# Patient Record
Sex: MALE | Race: AMERICAN INDIAN OR ALASKA NATIVE
[De-identification: names, ages, dates, MRNs, and addresses within clinical notes are randomized per-mention and may not be internally consistent; named-entity substitution may affect disease eponyms.]

---

## 2018-09-26 NOTE — ANESTHESIA CONSULTATION
Anesthesia Consult and Med Hx


Date of service: 09/26/18





- Airway


Anesthetic Teeth Evaluation: Good


ROM Head & Neck: Adequate


Mental/Hyoid Distance: Adequate


Mallampati Class: Class I


Intubation Access Assessment: Good





- Pulmonary Exam


CTA: Yes





- Cardiac Exam


Cardiac Exam: RRR





- Pre-Operative Health Status


ASA Pre-Surgery Classification: ASA2


Proposed Anesthetic Plan: MAC (NPO, Hemodynamically stable for procedure)

## 2018-09-26 NOTE — XRAY REPORT
CHEST ONE VIEW



INDICATION: Chest pain, nausea, vomiting.



COMPARISON: None similar at this institution.



FINDINGS: Portable, single, frontal chest radiograph demonstrates 

normal cardiomediastinal silhouette. Clear lungs. Unremarkable bones. 

Extrinsic EKG leads.



CONCLUSION: No acute disease in the chest.



Thank you for the opportunity to participate in this patient's care.

## 2018-09-26 NOTE — OPERATIVE REPORT
Operative Report


Operative Report: 


Esophagogastroduodenoscopy Procedure Note 





Date of procedure: 9/26/2018





Endoscopist: Davon Calderon





Pre-op diagnosis: UGI bleed





Post-op diagnosis: Severe (LA Grade IV esophagitis), small brenda joaquin tear, 

gastritis





Anesthesia: MAC





Complications: No immediate complications





Estimated blood loss: minimal





Procedure:  After consent was obtained, the patient was placed in the left 

lateral decubitus position.  The fujinon endoscope was inserted into the patient

's mouth under direct vision, and advanced into the 2nd portion of the duodenum 

without difficulty.  The patient tolerated the procedure well.  The views of 

the mucosa were good.  Patient's vital signs were monitored continuously 

throughout the procedure.





Findings:





There was severe (LA Grade IV) esophagitis in the mid-lower third of the 

esophagus.  There was a small possible brenda joaquin tear at the GE junction 

without high risk bleeding stigmata.





There was a moderate sized hiatal hernia.





There was severe localized erythematous gastric mucosa in the proximal body of 

the stomach (likely from multiple retching episodes based on location of 

erythema).  Otherwise, the stomach appeared normal.





The duodenum appeared normal.





Impression:


1. Severe esophagitis


2. Small brenda joaquin tear without high risk bleeding stigmata


3. Localized gastritis , likely from retching episodes.





Recommendations:


-cont PPI BID (po) dosing


-alcohol cessation


-avoid NSAID's


-repeat EGD in 2-3 months as outpatient to assess for esophagitis healing





Will sign off, please call as needed or with questions.  Follow-up in GI clinic 

in 1 month.

## 2018-09-26 NOTE — PROGRESS NOTE
Assessment and Plan





dictated 


no acute gu issues 


rec f/u as out pt 





Subjective


Date of service: 09/26/18


Principal diagnosis: renal stones 





Objective





- Constitutional


Vitals: 


 Vital Signs - 12hr











  09/26/18 09/26/18 09/26/18





  04:17 05:41 06:00


 


Temperature 98.2 F  98.5 F


 


Pulse Rate 75  75


 


Respiratory 18  16





Rate   


 


Blood Pressure 158/100  149/88


 


Blood Pressure   149/88





[Right]   


 


O2 Sat by Pulse 98 100 100





Oximetry   














  09/26/18 09/26/18 09/26/18





  06:11 06:30 07:00


 


Temperature 99.1 F  


 


Pulse Rate 69 73 83


 


Respiratory 17 17 18





Rate   


 


Blood Pressure  154/102 139/80


 


Blood Pressure 154/102  





[Right]   


 


O2 Sat by Pulse 98 98 99





Oximetry   














  09/26/18 09/26/18 09/26/18





  07:30 08:00 09:30


 


Temperature   


 


Pulse Rate 78 78 62


 


Respiratory 19 11 L 8 L





Rate   


 


Blood Pressure 111/79 148/95 148/95


 


Blood Pressure   





[Right]   


 


O2 Sat by Pulse 98 99 100





Oximetry   














  09/26/18 09/26/18 09/26/18





  09:40 09:50 10:18


 


Temperature   


 


Pulse Rate 75 85 82


 


Respiratory 14 13 





Rate   


 


Blood Pressure 148/95 148/95 147/92


 


Blood Pressure   





[Right]   


 


O2 Sat by Pulse 99 92 100





Oximetry   














  09/26/18 09/26/18 09/26/18





  11:54 12:00 12:58


 


Temperature 97.8 F 98.4 F 98.6 F


 


Pulse Rate 63 72 72


 


Respiratory 20 16 11 L





Rate   


 


Blood Pressure 118/78  128/80


 


Blood Pressure  142/78 





[Right]   


 


O2 Sat by Pulse 96 100 99





Oximetry   














  09/26/18 09/26/18





  13:04 14:26


 


Temperature 98.6 F 98.7 F


 


Pulse Rate 72 83


 


Respiratory 11 L 14





Rate  


 


Blood Pressure 128/80 139/99


 


Blood Pressure  





[Right]  


 


O2 Sat by Pulse 99 99





Oximetry  











General appearance: Present: no acute distress





- Neck


Neck: supple





- Respiratory


Respiratory effort: normal





- Gastrointestinal


General gastrointestinal: Present: non-tender


Rectal Exam: normal rectal tone





- Genitourinary


Male genitourinary: normal





- Labs


CBC & Chem 7: 


 09/26/18 01:29





 09/26/18 01:29


Labs: 


 Abnormal lab results











  09/26/18 09/26/18 09/26/18 Range/Units





  01:29 01:29 10:30 


 


WBC  13.4 H    (4.5-11.0)  K/mm3


 


RDW  12.9 L    (13.2-15.2)  %


 


Seg Neutrophils %  77.8 H    (40.0-70.0)  %


 


Seg Neutrophils #  10.4 H    (1.8-7.7)  K/mm3


 


Potassium   3.4 L   (3.6-5.0)  mmol/L


 


Creatinine   0.7 L   (0.8-1.5)  mg/dL


 


Glucose   147 H   ()  mg/dL


 


Ur Specific Gravity    > 1.050 H  (1.003-1.030)

## 2018-09-26 NOTE — CAT SCAN REPORT
CT ABDOMEN AND PELVIS WITH CONTRAST



INDICATION: Abdominal pain, nausea, vomiting.  Patient states history 

of pancreatitis.



COMPARISON: None similar.



FINDINGS: Abdomen and pelvis CT performed following intravenous 

administration of 100 cc of Omnipaque 300.



LUNG BASES: Moderate to severe nonspecific distal esophageal wall 

prominence/thickening, not excluded for gastroesophageal reflux and/or 

hiatal hernia, amongst others.



ABDOMEN: Liver, spleen, gallbladder, pancreas, adrenals, nonaneurysmal 

abdominal aorta with slight atherosclerotic changes and IVC within 

normal limits.  No ascites or size significant adenopathy.  

Nonopacified GI tract valuation limited, though grossly nonobstructive. 

 Stomach decompressed with nonspecific exaggerated wall thickness.  

Multiple ascending colon diverticula incidentally noted.



Mild left intrarenal collecting system, renal pelvis and proximal 

ureteral dilatation noted, though proximal ureter promptly tapers to a 

normal caliber anterior to the psoas muscle as on axial series 2, 

images 81-90.  A 5 mm left interpolar calculus also noted, axial image 

62.  Tiny, subcentimeter bilateral renal cortical hypodensities.  

Otherwise unremarkable kidneys.  No hydronephrosis on the right.



PELVIS: Opacified right ureter is unremarkable.  Left ureter though not 

opacified and difficult to accurately follow to the bladder base with a 

tiny 2-3 mm left hemipelvic calcification as on axial series 4, image 

53 presumed to represent a phlebolith rather than a distal ureteral 

stone.  Few other pelvic vascular calcifications also noted.  Otherwise 

unremarkable urinary bladder, seminal vesicles, prostate and 

rectosigmoid.  No free fluid or significant adenopathy.  Approximately 

1.5 cm fat containing right inguinal hernia proximally, axial image 158.



Bilateral SI joint degenerative bridging osteophytes.  Mild lower 

thoracic and upper lumbar spine degenerative spurring also seen.



CONCLUSION:



1. Extensive distal esophageal thickening as also nonspecific 

exaggerated gastric wall thickness, as detailed above.  Etiologies as 

esophagitis/gastroesophageal reflux/hiatal hernia and peptic ulcer 

disease, amongst others, may be correlated for clinically in an 

appropriate setting.



2.  Approximately 5 mm left renal calculus with mild dilatation of the 

left renal collecting system, as detailed above.  No obstructing 

ureteral calculus though convincingly identified, as described.



3.  Few other findings as diverticulosis, as above.



I phoned the above results to Dr. Lopez in the ER, 8:40 AM, 

9/26/2018.



Thank you for the opportunity to participate in this patient's care.

## 2018-09-26 NOTE — EMERGENCY DEPARTMENT REPORT
ED GI Bleed HPI





- General


Chief complaint: Abdominal Pain


Stated complaint: CP


Time Seen by Provider: 09/26/18 07:37


Source: patient, RN notes reviewed


Mode of arrival: Ambulatory


Limitations: No Limitations





- History of Present Illness


Initial comments: 





This is a 38-year-old gentleman who is not known to this provider previously, 

who does not currently have a primary care doctor, and reports surgical history 

for appendectomy.  He reports a past medical history for pancreatitis.





Patient reports that he was in his usual state of health a few days ago, and 

consumed some fried and fatty foods, and shortly thereafter developed 

epigastric pain.  The pain did not radiate to the back, arms or neck.  It was 

associated by nausea and vomiting.  He reports coffee-ground emesis.  He thinks 

that his first episode of emesis was the food that he ate, and then was 

subsequently coffee-ground emesis.  Patient was given medication prior to my 

arrival, including Zofran, which he indicates improved his symptoms.  He denies 

DVT, pulmonary embolus risk factors.  He denies headache, neck pain, shortness 

of breath, lower abdominal pain, irritative/obstructive urinary symptoms.





MD complaint: coffee ground emesis


-: Gradual


Location: epigastric


Radiation: none


Severity scale (0 -10): 8


Quality: cramping


Consistency: intermittent


Improves with: none


Worsens with: none


Associated Symptoms: abdominal pain, nausea, vomiting.  denies: epistaxis, fever

/chills, headaches, loss of appetite, malaise, easy bruising, rash, other 

bleeding, shortness of breath, syncope, weakness





- Related Data


 Allergies











Allergy/AdvReac Type Severity Reaction Status Date / Time


 


No Known Allergies Allergy   Verified 09/26/18 01:19














ED Review of Systems


ROS: 


Stated complaint: CP


Other details as noted in HPI





Constitutional: denies: fever


Eyes: denies: eye discharge


ENT: denies: epistaxis


Respiratory: denies: cough


Cardiovascular: denies: syncope


Gastrointestinal: abdominal pain, nausea, vomiting.  denies: melena, 

hematochezia


Genitourinary: denies: dysuria


Musculoskeletal: denies: back pain


Skin: denies: lesions


Neurological: denies: weakness


Psychiatric: anxiety





ED Past Medical Hx





- Past Medical History


Previous Medical History?: Yes


Additional medical history: pancreatis





- Surgical History


Hx Appendectomy: Yes


Additional Surgical History: tonsil





- Social History


Smoking Status: Current Every Day Smoker


Substance Use Type: Alcohol





ED Physical Exam





- General


Limitations: No Limitations


General appearance: alert, in no apparent distress





- Head


Head exam: Present: atraumatic, normocephalic





- Eye


Eye exam: Present: normal appearance, EOMI.  Absent: nystagmus





- ENT


ENT exam: Present: normal exam, normal orophraynx, mucous membranes moist, 

normal external ear exam





- Neck


Neck exam: Present: normal inspection, full ROM.  Absent: tenderness, 

meningismus





- Respiratory


Respiratory exam: Present: normal lung sounds bilaterally.  Absent: respiratory 

distress





- Cardiovascular


Cardiovascular Exam: Present: regular rate, normal rhythm, normal heart sounds.

  Absent: bradycardia, tachycardia, irregular rhythm, systolic murmur, 

diastolic murmur, rubs, gallop





- GI/Abdominal


GI/Abdominal exam: Present: soft.  Absent: distended, tenderness, guarding, 

rebound, rigid, pulsatile mass





- Rectal


Rectal exam: Present: deferred





- Extremities Exam


Extremities exam: Present: normal inspection, full ROM, normal capillary refill

, other (2+ pulses noted in the bilateral upper, lower extremities.  

Compartments soft.  No long bony tenderness.  The pelvis is stable.).  Absent: 

tenderness, pedal edema, joint swelling, calf tenderness





- Back Exam


Back exam: Present: normal inspection, full ROM.  Absent: tenderness, CVA 

tenderness (R), paraspinal tenderness, vertebral tenderness





- Neurological Exam


Neurological exam: Present: alert, oriented X3, CN II-XII intact, normal gait, 

other (Extraocular movements intact.  Tongue midline.  No facial droop.  Facial 

sensation intact to light touch in the V1, V2, V3 distribution bilaterally.  5 

and 5 strength in 4 extremities..  Sensation is intact to light touch in 4 

extremities.).  Absent: motor sensory deficit





- Psychiatric


Psychiatric exam: Present: normal affect, normal mood





- Skin


Skin exam: Present: warm, dry, intact, normal color.  Absent: rash





ED Course


 Vital Signs











  09/26/18 09/26/18 09/26/18





  01:15 04:17 05:41


 


Temperature 99.5 F 98.2 F 


 


Pulse Rate 70 75 


 


Respiratory 22 18 





Rate   


 


Blood Pressure 163/101 158/100 


 


Blood Pressure   





[Right]   


 


O2 Sat by Pulse 99 98 100





Oximetry   














  09/26/18 09/26/18 09/26/18





  06:00 06:11 06:30


 


Temperature 98.5 F 99.1 F 


 


Pulse Rate 75 69 73


 


Respiratory 16 17 17





Rate   


 


Blood Pressure 149/88  154/102


 


Blood Pressure 149/88 154/102 





[Right]   


 


O2 Sat by Pulse 100 98 98





Oximetry   














ED Medical Decision Making





- Lab Data


Result diagrams: 


 09/26/18 01:29





 09/26/18 01:29








 Vital Signs











  09/26/18 09/26/18 09/26/18





  01:15 04:17 05:41


 


Temperature 99.5 F 98.2 F 


 


Pulse Rate 70 75 


 


Respiratory 22 18 





Rate   


 


Blood Pressure 163/101 158/100 


 


Blood Pressure   





[Right]   


 


O2 Sat by Pulse 99 98 100





Oximetry   














  09/26/18 09/26/18 09/26/18





  06:00 06:11 06:30


 


Temperature 98.5 F 99.1 F 


 


Pulse Rate 75 69 73


 


Respiratory 16 17 17





Rate   


 


Blood Pressure 149/88  154/102


 


Blood Pressure 149/88 154/102 





[Right]   


 


O2 Sat by Pulse 100 98 98





Oximetry   











 Lab Results











  09/26/18 09/26/18 Range/Units





  01:29 01:29 


 


WBC  13.4 H   (4.5-11.0)  K/mm3


 


RBC  4.69   (3.65-5.03)  M/mm3


 


Hgb  14.6   (11.8-15.2)  gm/dl


 


Hct  43.0   (35.5-45.6)  %


 


MCV  92   (84-94)  fl


 


MCH  31   (28-32)  pg


 


MCHC  34   (32-34)  %


 


RDW  12.9 L   (13.2-15.2)  %


 


Plt Count  240   (140-440)  K/mm3


 


Lymph % (Auto)  16.0   (13.4-35.0)  %


 


Mono % (Auto)  5.9   (0.0-7.3)  %


 


Eos % (Auto)  0.0   (0.0-4.3)  %


 


Baso % (Auto)  0.3   (0.0-1.8)  %


 


Lymph #  2.1   (1.2-5.4)  K/mm3


 


Mono #  0.8   (0.0-0.8)  K/mm3


 


Eos #  0.0   (0.0-0.4)  K/mm3


 


Baso #  0.0   (0.0-0.1)  K/mm3


 


Seg Neutrophils %  77.8 H   (40.0-70.0)  %


 


Seg Neutrophils #  10.4 H   (1.8-7.7)  K/mm3


 


Sodium   141  (137-145)  mmol/L


 


Potassium   3.4 L  (3.6-5.0)  mmol/L


 


Chloride   98.9  ()  mmol/L


 


Carbon Dioxide   27  (22-30)  mmol/L


 


Anion Gap   19  mmol/L


 


BUN   11  (9-20)  mg/dL


 


Creatinine   0.7 L  (0.8-1.5)  mg/dL


 


Estimated GFR   > 60  ml/min


 


BUN/Creatinine Ratio   16  %


 


Glucose   147 H  ()  mg/dL


 


Calcium   9.6  (8.4-10.2)  mg/dL


 


Total Bilirubin   0.50  (0.1-1.2)  mg/dL


 


AST   15  (5-40)  units/L


 


ALT   15  (7-56)  units/L


 


Alkaline Phosphatase   72  ()  units/L


 


Total Protein   7.7  (6.3-8.2)  g/dL


 


Albumin   4.6  (3.9-5)  g/dL


 


Albumin/Globulin Ratio   1.5  %


 


Lipase   18  (13-60)  units/L














- EKG Data


-: EKG Interpreted by Me


EKG shows normal: sinus rhythm


Rate: normal





- EKG Data


When compared to previous EKG there are: previous EKG unavailable





09/26/18 08:07


Sinus, 75 bpm, normal axis, normal intervals, high left ventricular voltage, 

motion artifact.  Not a STEMI





- Radiology Data


Radiology results: image reviewed


interpreted by me: 





X-ray of the chest is negative for acute disease





- Medical Decision Making





Differential diagnosis, including but not limited to: Pancreatitis, Adelia-

Bray tear, upper GI bleed, hiatal hernia, GERD, gastritis





Assessment and plan: 38-year-old male with history of pancreatitis who recently 

consumed alcohol and fatty foods, with epigastric pain and coffee-ground 

emesis.  Patient has a photograph of the emesis and it looks quite copious.  He 

is hemodynamically stable, and denies bright red blood or black tarry stool.  X-

ray of the chest is unremarkable.  Hemoglobin, hematocrit appropriate within 

normal limits at this time.  I suspect a Adelia-Bray tear.  However, given 

volume of coffee ground emesis noted on photograph, patient will be admitted to 

the medical service for serial abdominal exams, IV fluids, IV proton pump 

inhibitor, and gastroenterology consultation.





The gastroenterologist on call, Dr. Calderon has graciously agreed to consult on 

the patient.





Hospital physician, Dr. Bray to admit.


Critical care attestation.: 


If time is entered above; I have spent that time in minutes in the direct care 

of this critically ill patient, excluding procedure time.








ED Disposition


Clinical Impression: 


 Coffee ground emesis





Disposition: DC-09 OP ADMIT IP TO THIS HOSP


Is pt being admited?: Yes


Condition: Good


Referrals: 


PRIMARY CARE,MD [Primary Care Provider] - 3-5 Days

## 2018-09-26 NOTE — GASTROENTEROLOGY CONSULTATION
<MIGUELANGELKARLATEENA BECERRA - Last Filed: 09/26/18 10:48>





History of Present Illness





- Reason for Consult


Consult date: 09/26/18


coffee-ground emesis


Requesting physician: DANIEL HOLGUIN





- History of Present Illness


Patient is a 39 y/o male with PMH of alcoholic pancreatitis on whom GI has been 

consulted to evaluate and treat coffee-ground emesis. This morning, patient was 

resting on stretcher w/o acute distress. He reports drinking a large amount of 

alcohol and eating fatty foods Monday night after work then woke up yesterday 

morning with epigastric pain and associated N/V. He states his emesis was food 

colored with the first few episodes of vomiting and then it turned into a black 

color mixed with a small amount of bright red blood for subsequent episodes. 

Last episode of vomiting was early this am between 0100 and 0300. States he 

also had diffuse CP described as a "burning" while vomiting that has since 

resolved. No melena or hematochezia. Last BM 2 days ago with brown stool per 

pt. Denies fever, SOB, dizziness, dysphagia, odynophagia, diarrhea, or 

constipation. No NSAID use. No hx of liver disease, GI bleeding or PUD. No 

previous EGD. 











Past History


Past Medical History: GERD, hypertension (controlled with diet), other (h/o 

alcoholic pancreatitis)


Past Surgical History: appendectomy, tonsillectomy, Other (kidney surgery)


Social history: other (+alcohol and marijuana use).  denies: smoking


Family history: diabetes, hypertension





Medications and Allergies


 Allergies











Allergy/AdvReac Type Severity Reaction Status Date / Time


 


No Known Allergies Allergy   Verified 09/26/18 01:19











Active Meds: 


Active Medications





Haloperidol Lactate (Haldol)  5 mg IV Q1H PRN


   PRN Reason: Unrespon. to mult. doses BZD's


Hydralazine HCl (Apresoline)  10 mg IV Q4HR PRN


   PRN Reason: Blood Pressure


Hydromorphone HCl (Dilaudid)  1 mg IV Q4H PRN


   PRN Reason: Pain , Severe (7-10)


Potassium Chloride (Kcl 10meq/100ml)  10 meq in 100 mls @ 100 mls/hr IV Q1H JAYLYN


   Stop: 09/26/18 10:59


Ceftriaxone Sodium (Rocephin/Ns 1 Gm/50 Ml)  1 gm in 50 mls @ 100 mls/hr IV 

Q24HR JAYLYN; Protocol


Sodium Chloride (Nacl 0.45% 1000 Ml)  1,000 mls @ 100 mls/hr IV AS DIRECT JAYLYN


Thiamine HCl 100 mg/ Sodium (Chloride)  51 mls @ 100 mls/hr IV QDAY JAYLYN


Lorazepam (Ativan)  2 mg IV Q1H PRN


   PRN Reason: CIWA-Ar 8-15


Lorazepam (Ativan)  4 mg IV Q1H PRN


   PRN Reason: CIWA-Ar 16-25


Lorazepam (Ativan)  4 mg IV Q15MIN PRN


   PRN Reason: CIWA-Ar >25


Metoclopramide HCl (Reglan)  10 mg IV Q8H PRN


   PRN Reason: Nausea And Vomiting


Ondansetron HCl (Zofran)  4 mg IV Q4H PRN


   PRN Reason: N/V unrelieved by Reglan


Pantoprazole Sodium (Protonix)  40 mg IV BID JAYLYN


   Last Admin: 09/26/18 09:47 Dose:  Not Given











Review of Systems





- Review of Systems


All systems: negative


Gastrointestinal: abdominal pain (epigastric), nausea, vomiting, coffee ground 

emesis





Exam





- Constitutional


Vital Signs: 


 











Temp Pulse Resp BP Pulse Ox


 


 99.1 F   78   11 L  148/95   99 


 


 09/26/18 06:11  09/26/18 08:00  09/26/18 08:00  09/26/18 08:00  09/26/18 08:00











General appearance: no acute distress





- EENT


Eyes: PERRL, EOM intact


ENT: hearing intact





- Respiratory


Respiratory: bilateral: CTA





- Cardiovascular


Rhythm: regular


Heart Sounds: Present: S1 & S2





- Gastrointestinal


General gastrointestinal: Present: soft, tender (slight generalized TTP ), non-

distended, normal bowel sounds





- Neurologic


Neurological: alert and oriented x3





- Labs


CBC & Chem 7: 


 09/26/18 01:29





 09/26/18 01:29


Lab Results: 


 Laboratory Results - last 24 hr











  09/26/18 09/26/18 09/26/18





  01:29 01:29 01:29


 


WBC  13.4 H  


 


RBC  4.69  


 


Hgb  14.6  


 


Hct  43.0  


 


MCV  92  


 


MCH  31  


 


MCHC  34  


 


RDW  12.9 L  


 


Plt Count  240  


 


Lymph % (Auto)  16.0  


 


Mono % (Auto)  5.9  


 


Eos % (Auto)  0.0  


 


Baso % (Auto)  0.3  


 


Lymph #  2.1  


 


Mono #  0.8  


 


Eos #  0.0  


 


Baso #  0.0  


 


Seg Neutrophils %  77.8 H  


 


Seg Neutrophils #  10.4 H  


 


PT   


 


INR   


 


Sodium   141 


 


Potassium   3.4 L 


 


Chloride   98.9 


 


Carbon Dioxide   27 


 


Anion Gap   19 


 


BUN   11 


 


Creatinine   0.7 L 


 


Estimated GFR   > 60 


 


BUN/Creatinine Ratio   16 


 


Glucose   147 H 


 


Calcium   9.6 


 


Magnesium    2.00


 


Total Bilirubin   0.50 


 


AST   15 


 


ALT   15 


 


Alkaline Phosphatase   72 


 


Troponin T   


 


Total Protein   7.7 


 


Albumin   4.6 


 


Albumin/Globulin Ratio   1.5 


 


Lipase   18 


 


Blood Type   


 


Antibody Screen   














  09/26/18 09/26/18 09/26/18





  07:50 07:57 07:57


 


WBC   


 


RBC   


 


Hgb   


 


Hct   


 


MCV   


 


MCH   


 


MCHC   


 


RDW   


 


Plt Count   


 


Lymph % (Auto)   


 


Mono % (Auto)   


 


Eos % (Auto)   


 


Baso % (Auto)   


 


Lymph #   


 


Mono #   


 


Eos #   


 


Baso #   


 


Seg Neutrophils %   


 


Seg Neutrophils #   


 


PT   13.6 


 


INR   0.99 


 


Sodium   


 


Potassium   


 


Chloride   


 


Carbon Dioxide   


 


Anion Gap   


 


BUN   


 


Creatinine   


 


Estimated GFR   


 


BUN/Creatinine Ratio   


 


Glucose   


 


Calcium   


 


Magnesium   


 


Total Bilirubin   


 


AST   


 


ALT   


 


Alkaline Phosphatase   


 


Troponin T  < 0.010  


 


Total Protein   


 


Albumin   


 


Albumin/Globulin Ratio   


 


Lipase   


 


Blood Type    O NEGATIVE


 


Antibody Screen    Negative














Assessment and Plan


1.UGIB


 2.coffee-ground emesis


-INR 0.99


-LFTs and lipase WNL


-chest x-ray negative


-abd CT showed extensive distal esophageal thickening, nonspecific gastric wall 

thickening, and kidney stone


-H/H 14.6/43.0-WNL


-continue to monitor H/H and transfuse as needed


-hold blood thinning medications


-Patient reports multiple episodes of vomiting yesterday with emesis initially 

being food colored that became black in  mixed with scant amount of bright red 

blood with following episodes 


-currently HD stable


-etiology-most likely 2/2 M-W tear vs other


-will schedule for an EGD today for further evaluation


-continue PPI and supportive care


-will follow


3.alcohol abuse


-cessation discussed and encouraged with patient


-monitor for withdrawal

















<EVAN LEWIS - Last Filed: 09/26/18 14:27>





Medications and Allergies


Active Meds: 


Active Medications





Haloperidol Lactate (Haldol)  5 mg IV Q1H PRN


   PRN Reason: Unrespon. to mult. doses BZD's


Hydralazine HCl (Apresoline)  10 mg IV Q4HR PRN


   PRN Reason: Blood Pressure


Hydromorphone HCl (Dilaudid)  1 mg IV Q4H PRN


   PRN Reason: Pain , Severe (7-10)


   Last Admin: 09/26/18 10:35 Dose:  1 mg


Ceftriaxone Sodium (Rocephin/Ns 1 Gm/50 Ml)  1 gm in 50 mls @ 100 mls/hr IV 

Q24HR JAYLYN; Protocol


   Last Admin: 09/26/18 10:45 Dose:  100 mls/hr


Sodium Chloride (Nacl 0.45% 1000 Ml)  1,000 mls @ 100 mls/hr IV AS DIRECT JAYLYN


   Last Admin: 09/26/18 10:28 Dose:  100 mls/hr


Thiamine HCl 100 mg/ Sodium (Chloride)  51 mls @ 100 mls/hr IV QDAY JAYLYN


Sodium Chloride (Nacl 0.9% 1000 Ml)  1,000 mls @ 50 mls/hr IV AS DIRECT JAYLYN


   Last Admin: 09/26/18 13:25 Dose:  50 mls/hr


Lorazepam (Ativan)  2 mg IV Q1H PRN


   PRN Reason: CIWA-Ar 8-15


Lorazepam (Ativan)  4 mg IV Q1H PRN


   PRN Reason: CIWA-Ar 16-25


Lorazepam (Ativan)  4 mg IV Q15MIN PRN


   PRN Reason: CIWA-Ar >25


Metoclopramide HCl (Reglan)  10 mg IV Q8H PRN


   PRN Reason: Nausea And Vomiting


   Last Admin: 09/26/18 10:27 Dose:  10 mg


Ondansetron HCl (Zofran)  4 mg IV Q4H PRN


   PRN Reason: N/V unrelieved by Reglan


Pantoprazole Sodium (Protonix)  40 mg IV BID [facility]


   Last Admin: 09/26/18 09:47 Dose:  Not Given











Exam





- Constitutional


Vital Signs: 


 











Temp Pulse Resp BP Pulse Ox


 


 98.6 F   72   11 L  128/80   99 


 


 09/26/18 13:04  09/26/18 13:04  09/26/18 13:04  09/26/18 13:04  09/26/18 13:04














- Labs


CBC & Chem 7: 


 09/26/18 01:29





 09/26/18 01:29


Lab Results: 


 Laboratory Results - last 24 hr











  09/26/18 09/26/18 09/26/18





  01:29 01:29 01:29


 


WBC  13.4 H  


 


RBC  4.69  


 


Hgb  14.6  


 


Hct  43.0  


 


MCV  92  


 


MCH  31  


 


MCHC  34  


 


RDW  12.9 L  


 


Plt Count  240  


 


Lymph % (Auto)  16.0  


 


Mono % (Auto)  5.9  


 


Eos % (Auto)  0.0  


 


Baso % (Auto)  0.3  


 


Lymph #  2.1  


 


Mono #  0.8  


 


Eos #  0.0  


 


Baso #  0.0  


 


Seg Neutrophils %  77.8 H  


 


Seg Neutrophils #  10.4 H  


 


PT   


 


INR   


 


Sodium   141 


 


Potassium   3.4 L 


 


Chloride   98.9 


 


Carbon Dioxide   27 


 


Anion Gap   19 


 


BUN   11 


 


Creatinine   0.7 L 


 


Estimated GFR   > 60 


 


BUN/Creatinine Ratio   16 


 


Glucose   147 H 


 


Calcium   9.6 


 


Magnesium    2.00


 


Total Bilirubin   0.50 


 


AST   15 


 


ALT   15 


 


Alkaline Phosphatase   72 


 


Troponin T   


 


Total Protein   7.7 


 


Albumin   4.6 


 


Albumin/Globulin Ratio   1.5 


 


Lipase   18 


 


Urine Color   


 


Urine Turbidity   


 


Urine pH   


 


Ur Specific Gravity   


 


Urine Protein   


 


Urine Glucose (UA)   


 


Urine Ketones   


 


Urine Blood   


 


Urine Nitrite   


 


Urine Bilirubin   


 


Urine Urobilinogen   


 


Ur Leukocyte Esterase   


 


Urine WBC (Auto)   


 


Urine RBC (Auto)   


 


Urine Mucus   


 


Blood Type   


 


Antibody Screen   














  09/26/18 09/26/18 09/26/18





  07:50 07:57 07:57


 


WBC   


 


RBC   


 


Hgb   


 


Hct   


 


MCV   


 


MCH   


 


MCHC   


 


RDW   


 


Plt Count   


 


Lymph % (Auto)   


 


Mono % (Auto)   


 


Eos % (Auto)   


 


Baso % (Auto)   


 


Lymph #   


 


Mono #   


 


Eos #   


 


Baso #   


 


Seg Neutrophils %   


 


Seg Neutrophils #   


 


PT   13.6 


 


INR   0.99 


 


Sodium   


 


Potassium   


 


Chloride   


 


Carbon Dioxide   


 


Anion Gap   


 


BUN   


 


Creatinine   


 


Estimated GFR   


 


BUN/Creatinine Ratio   


 


Glucose   


 


Calcium   


 


Magnesium   


 


Total Bilirubin   


 


AST   


 


ALT   


 


Alkaline Phosphatase   


 


Troponin T  < 0.010  


 


Total Protein   


 


Albumin   


 


Albumin/Globulin Ratio   


 


Lipase   


 


Urine Color   


 


Urine Turbidity   


 


Urine pH   


 


Ur Specific Gravity   


 


Urine Protein   


 


Urine Glucose (UA)   


 


Urine Ketones   


 


Urine Blood   


 


Urine Nitrite   


 


Urine Bilirubin   


 


Urine Urobilinogen   


 


Ur Leukocyte Esterase   


 


Urine WBC (Auto)   


 


Urine RBC (Auto)   


 


Urine Mucus   


 


Blood Type    O NEGATIVE


 


Antibody Screen    Negative














  09/26/18 09/26/18





  10:30 11:56


 


WBC  


 


RBC  


 


Hgb  


 


Hct  


 


MCV  


 


MCH  


 


MCHC  


 


RDW  


 


Plt Count  


 


Lymph % (Auto)  


 


Mono % (Auto)  


 


Eos % (Auto)  


 


Baso % (Auto)  


 


Lymph #  


 


Mono #  


 


Eos #  


 


Baso #  


 


Seg Neutrophils %  


 


Seg Neutrophils #  


 


PT  


 


INR  


 


Sodium  


 


Potassium  


 


Chloride  


 


Carbon Dioxide  


 


Anion Gap  


 


BUN  


 


Creatinine  


 


Estimated GFR  


 


BUN/Creatinine Ratio  


 


Glucose  


 


Calcium  


 


Magnesium  


 


Total Bilirubin  


 


AST  


 


ALT  


 


Alkaline Phosphatase  


 


Troponin T   < 0.010


 


Total Protein  


 


Albumin  


 


Albumin/Globulin Ratio  


 


Lipase  


 


Urine Color  Yellow 


 


Urine Turbidity  Clear 


 


Urine pH  6.0 


 


Ur Specific Gravity  > 1.050 H 


 


Urine Protein  <15 mg/dl 


 


Urine Glucose (UA)  Neg 


 


Urine Ketones  20 


 


Urine Blood  Neg 


 


Urine Nitrite  Neg 


 


Urine Bilirubin  Neg 


 


Urine Urobilinogen  2.0 


 


Ur Leukocyte Esterase  Neg 


 


Urine WBC (Auto)  2.0 


 


Urine RBC (Auto)  1.0 


 


Urine Mucus  Few 


 


Blood Type  


 


Antibody Screen  














Assessment and Plan


Pt seen and examined.  Agree with note above.  will plan for EGD today.  

Further recommendations to follow.

## 2018-09-26 NOTE — HISTORY AND PHYSICAL REPORT
History of Present Illness


Date of examination: 18


Chief complaint: 


Stomach pains


History of present illness: 


Patient is 37 yo man with a history of alcoholic pancreatitis, GERD and alcohol 

abuse who presents to [facility] ED with severe acute onset constant nonradiating 

epigastric abdominal pains. He was in his usual state of health a few days ago, 

when he went out drinking alcohol and eating fried, fatty foods. He was 

drinking beer and mixed alcohol drinks. He lost count of how much alcohol he 

consumed. Then he started having severe constant nausea and vomiting associated 

with diffuse chest pains located over entire chest while vomiting. Then the 

vomitus turned black followed by severe epigastric abdominal pains so he 

decided to come to the ED this morning. He took a picture of the vomitus on his 

smartphone, which appear to be coffee ground emesis, more than a tablespoon. We 

did have a conversation regarding the knowledge of alcohol causing pancreatitis 

but he is still drinking alcohol. He minimizes his alcohol consumption. 





PMH: as HPI, also includes being told he had elevated blood pressure but no 

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


PSH: Left kidney open surgery for blockage, tonsillectomy, appendectomy


SH: no tobacco smoking, +ETOH abuse, +marijuana use


FH: mother with hypertension, cousin  of DM complication, unaware of any 

early CAD





ROS: 


Constitutional: denies: fever 


ENT: denies: throat or neck pain 


Respiratory: denies: cough, shortness of breath 


Cardiovascular: + chest pain while vomiting


Endocrine: denies unexplained weight loss or gain 


Gastrointestinal: + abdominal pain, nausea 


Genitourinary: denies: dysuria 


Rectal: denies no incontinence, no bleeding, no itching, no discharge 


Musculoskeletal: denies swelling, myaglia, muscle weakness 


Skin: denies: rash 


Neurological: denies: headache 


Hematological/Lymphatic: denies: easy bleeding or easy bruising 


Allergic/Immunologic: no urticaria, no allergic rhinitis, no anaphylaxis 


Psych: denies sadness or hopelessness, SI/HI 





Medications and Allergies


 Allergies











Allergy/AdvReac Type Severity Reaction Status Date / Time


 


No Known Allergies Allergy   Verified 18 01:19











Active Meds: 


Active Medications





Hydromorphone HCl (Dilaudid)  1 mg IV Q4H PRN


   PRN Reason: Pain , Severe (7-10)


Potassium Chloride (Kcl 10meq/100ml)  10 meq in 100 mls @ 100 mls/hr IV Q1H JAYLYN


   Stop: 18 10:59


Ceftriaxone Sodium (Rocephin/Ns 1 Gm/50 Ml)  1 gm in 50 mls @ 100 mls/hr IV 

Q24HR JAYLYN; Protocol


Sodium Chloride (Nacl 0.45% 1000 Ml)  1,000 mls @ 100 mls/hr IV AS DIRECT JAYLYN


Metoclopramide HCl (Reglan)  10 mg IV Q8H PRN


   PRN Reason: Nausea And Vomiting


Ondansetron HCl (Zofran)  4 mg IV Q4H PRN


   PRN Reason: N/V unrelieved by Reglan


Pantoprazole Sodium (Protonix)  40 mg IV BID JAYLYN











Exam





- Physical Exam


Narrative exam: 


GEN: WDWN, ill appearing with writhing in pain, NAD, Awake, Alert, Orientated x 

3


HEENT: NCAT, EOMI, PERRL, OP Clear 


NECK: supple, no adenopathy, no thyromegaly, no JVD 


CVS/HEART: RRR, normal S1S2, pulses present bilaterally 


CHEST/LUNGS: CTA B, Symmetrical chest expansion, good air entry bilaterally 


GI/Abdomen: soft, nondistended, epigastric abd pains, good bowel sounds, no 

guarding or rebound 


/Bladder: no suprapubic tenderness, no CVA or paraspinal tenderness 


EXT/Skin: no c/c/e, no obvious rash 


MSK: FROM x 4 


Neuro: CN 2-12 grossly intact, no new focal deficits 


Psych: calm





- Constitutional


Vitals: 


 











Temp Pulse Resp BP Pulse Ox


 


 99.1 F   73   17   154/102   98 


 


 18 06:11  18 06:30  18 06:30  18 06:30  18 06:30














Results





- Labs


CBC & Chem 7: 


 18 01:29





 18 01:29


Labs: 


 Abnormal lab results











  18 Range/Units





  01:29 01:29 


 


WBC  13.4 H   (4.5-11.0)  K/mm3


 


RDW  12.9 L   (13.2-15.2)  %


 


Seg Neutrophils %  77.8 H   (40.0-70.0)  %


 


Seg Neutrophils #  10.4 H   (1.8-7.7)  K/mm3


 


Potassium   3.4 L  (3.6-5.0)  mmol/L


 


Creatinine   0.7 L  (0.8-1.5)  mg/dL


 


Glucose   147 H  ()  mg/dL














Assessment and Plan


Patient is 37 yo man with a history of alcoholic pancreatitis, GERD and alcohol 

abuse who presents to [facility] ED with severe acute onset constant nonradiating 

epigastric abdominal pains. He was in his usual state of health a few days ago, 

when he went out drinking alcohol and eating fried, fatty foods. He was 

drinking beer and mixed alcohol drinks. He lost count of how much alcohol he 

consumed. Then he started having severe constant nausea and vomiting associated 

with diffuse chest pains located over entire chest while vomiting. Then the 

vomitus turned black followed by severe epigastric abdominal pains so he 

decided to come to the ED this morning. He took a picture of the vomitus on his 

smartphone, which appear to be coffee ground emesis, more than a tablespoon. We 

did have a conversation regarding his admission that alcohol caused 

pancreatitis in the past but he is still drinking alcohol. He minimizes his 

alcohol consumption. 





* CT abd/pelvis with IV contrast CONCLUSION: 1. Extensive distal esophageal 

thickening as also nonspecific exaggerated gastric wall thickness, as detailed 

above. Etiologies as esophagitis/gastroesophageal reflux/hiatal hernia and 

peptic ulcer disease, amongst others, may be correlated for clinically in an 

appropriate setting. 2. Approximately 5 mm left renal calculus with mild 

dilatation of the left renal collecting system, as detailed above. No 

obstructing ureteral calculus though convincingly identified, as described. 3. 

Few other findings as diverticulosis, as above. I phoned the above results to 

Dr. Lopez in the ER, 8:40 AM, 2018.


* pCXR reported as unremarkable


* EKG SR 75, early repolarization





-SIRS due pancreatitis with wbc 13.4, and RR 22: treat with empiric antibiotics 

for suspected Adelia Bray tear, ordered blood culture


-Acute UGIB with normal H/H (early GIB) so far: keep NPO, treat with iv protonix

, repeat H/H, GI already consulted per ED physician, whom I spoke with


-History of alcohol pancreatitis: keep NPO, treat with IVF, IV narcotics, 


-Accelerated hypertension: treat with iv hydralazine prn


-GERD: treat with ppi


-Alcohol abuse: counseling and education done, treat with iv thiamine, CIWA 

protocol


-Chest pains, atypical, most likely GERD related: check Troponin, treat with PPI


-Left renal Incidential calculus: consulted Urology


-DVT prophylaxis: scd only due to GIB bleed





CCT 37 minutes

## 2018-09-27 NOTE — PROGRESS NOTE
Assessment and Plan


Assessment and plan: 


Patient is 39 yo man with a history of alcoholic pancreatitis, GERD and alcohol 

abuse who presents to [facility] ED with severe acute onset constant nonradiating 

epigastric abdominal pains.





* CT abd/pelvis with IV contrast CONCLUSION: 1. Extensive distal esophageal 

thickening as also nonspecific exaggerated gastric wall thickness, as detailed 

above. Etiologies as esophagitis/gastroesophageal reflux/hiatal hernia and 

peptic ulcer disease, amongst others, may be correlated for clinically in an 

appropriate setting. 2. Approximately 5 mm left renal calculus with mild 

dilatation of the left renal collecting system, as detailed above. No 

obstructing ureteral calculus though convincingly identified, as described. 3. 

Few other findings as diverticulosis, as above. I phoned the above results to 

Dr. Lopez in the ER, 8:40 AM, 9/26/2018.


* pCXR reported as unremarkable


* EKG SR 75, early repolarization





-SIRS due pancreatitis with wbc 13.4, and RR 22: treat with empiric antibiotics 

for suspected Brenda Bray tear, ordered blood culture


-Acute UGIB with normal H/H (early GIB) so far: keep NPO, treat with iv protonix

, repeat H/H, GI already consulted per ED physician, whom I spoke with


-History of alcohol pancreatitis: keep NPO, treat with IVF, IV narcotics, 


-Accelerated hypertension: treat with iv hydralazine prn


-GERD: treat with ppi


-Alcohol abuse: counseling and education done, treat with iv thiamine, CIWA 

protocol


-Chest pains, atypical, most likely GERD related: check Troponin, treat with PPI


-Left renal Incidential calculus: consulted Urology


-DVT prophylaxis: scd only due to GIB bleed





EGD 9/26/18 Impression:


1. Severe esophagitis


2. Small brenda joaquin tear without high risk bleeding stigmata


3. Localized gastritis , likely from retching episodes.


Recommendations:


-cont PPI BID (po) dosing


-alcohol cessation


-avoid NSAID's


-repeat EGD in 2-3 months as outpatient to assess for esophagitis healing


Will sign off, please call as needed or with questions.  Follow-up in GI clinic 

in 1 month.





NM renal scan with lasix per Urology is pending. 








History


Interval history: 


Patient was seen and examined. Follow-up on current diagnosis of abd pains. 

Overnight uneventful. Patient denies any chest pain, shortness breath, nausea/

vomiting or severe headaches. Imaging, nursing note, chart, labs and old chart 

reviewed. Discussed with patient. 








Hospitalist Physical





- Physical exam


Narrative exam: 


GEN: WDWN, NAD, Awake, Alert, Orientated x 3


HEENT: NCAT, EOMI, PERRL, OP Clear 


NECK: supple, no adenopathy, no thyromegaly, no JVD 


CVS/HEART: RRR, normal S1S2, pulses present bilaterally 


CHEST/LUNGS: CTA B, Symmetrical chest expansion, good air entry bilaterally 


GI/Abdomen: soft, nondistended, epigastric abd pains, good bowel sounds, no 

guarding or rebound 


/Bladder: no suprapubic tenderness, no CVA or paraspinal tenderness 


EXT/Skin: no c/c/e, no obvious rash 


MSK: FROM x 4 


Neuro: CN 2-12 grossly intact, no new focal deficits 


Psych: calm





- Constitutional


Vitals: 


 











Temp Pulse Resp BP Pulse Ox


 


 98.1 F   57 L  22   142/91   99 


 


 09/27/18 12:01  09/27/18 05:38  09/27/18 12:01  09/27/18 12:01  09/27/18 05:38











General appearance: Present: no acute distress





Results





- Labs


CBC & Chem 7: 


 09/27/18 04:59





 09/27/18 04:59


Labs: 


 Laboratory Last Values











WBC  9.2 K/mm3 (4.5-11.0)   09/27/18  04:59    


 


RBC  4.15 M/mm3 (3.65-5.03)   09/27/18  04:59    


 


Hgb  13.1 gm/dl (11.8-15.2)   09/27/18  04:59    


 


Hct  37.7 % (35.5-45.6)   09/27/18  04:59    


 


MCV  91 fl (84-94)   09/27/18  04:59    


 


MCH  32 pg (28-32)   09/27/18  04:59    


 


MCHC  35 % (32-34)  H  09/27/18  04:59    


 


RDW  12.7 % (13.2-15.2)  L  09/27/18  04:59    


 


Plt Count  210 K/mm3 (140-440)   09/27/18  04:59    


 


Lymph % (Auto)  16.0 % (13.4-35.0)   09/26/18  01:29    


 


Mono % (Auto)  5.9 % (0.0-7.3)   09/26/18  01:29    


 


Eos % (Auto)  0.0 % (0.0-4.3)   09/26/18  01:29    


 


Baso % (Auto)  0.3 % (0.0-1.8)   09/26/18  01:29    


 


Lymph #  2.1 K/mm3 (1.2-5.4)   09/26/18  01:29    


 


Mono #  0.8 K/mm3 (0.0-0.8)   09/26/18  01:29    


 


Eos #  0.0 K/mm3 (0.0-0.4)   09/26/18  01:29    


 


Baso #  0.0 K/mm3 (0.0-0.1)   09/26/18  01:29    


 


Seg Neutrophils %  77.8 % (40.0-70.0)  H  09/26/18  01:29    


 


Seg Neutrophils #  10.4 K/mm3 (1.8-7.7)  H  09/26/18  01:29    


 


PT  13.6 Sec. (12.2-14.9)   09/26/18  07:57    


 


INR  0.99  (0.87-1.13)   09/26/18  07:57    


 


Sodium  141 mmol/L (137-145)   09/27/18  04:59    


 


Potassium  3.9 mmol/L (3.6-5.0)   09/27/18  04:59    


 


Chloride  101.5 mmol/L ()   09/27/18  04:59    


 


Carbon Dioxide  29 mmol/L (22-30)   09/27/18  04:59    


 


Anion Gap  14 mmol/L  09/27/18  04:59    


 


BUN  8 mg/dL (9-20)  L  09/27/18  04:59    


 


Creatinine  0.7 mg/dL (0.8-1.5)  L  09/27/18  04:59    


 


Estimated GFR  > 60 ml/min  09/27/18  04:59    


 


BUN/Creatinine Ratio  11 %  09/27/18  04:59    


 


Glucose  97 mg/dL ()   09/27/18  04:59    


 


Calcium  8.9 mg/dL (8.4-10.2)   09/27/18  04:59    


 


Magnesium  2.00 mg/dL (1.7-2.3)   09/26/18  01:29    


 


Total Bilirubin  0.70 mg/dL (0.1-1.2)   09/27/18  04:59    


 


AST  17 units/L (5-40)   09/27/18  04:59    


 


ALT  15 units/L (7-56)   09/27/18  04:59    


 


Alkaline Phosphatase  61 units/L ()   09/27/18  04:59    


 


Troponin T  < 0.010 ng/mL (0.00-0.029)   09/26/18  17:47    


 


Total Protein  6.4 g/dL (6.3-8.2)   09/27/18  04:59    


 


Albumin  4.0 g/dL (3.9-5)   09/27/18  04:59    


 


Albumin/Globulin Ratio  1.7 %  09/27/18  04:59    


 


Lipase  18 units/L (13-60)   09/26/18  01:29    


 


Urine Color  Yellow  (Yellow)   09/26/18  10:30    


 


Urine Turbidity  Clear  (Clear)   09/26/18  10:30    


 


Urine pH  6.0  (5.0-7.0)   09/26/18  10:30    


 


Ur Specific Gravity  > 1.050  (1.003-1.030)  H  09/26/18  10:30    


 


Urine Protein  <15 mg/dl mg/dL (Negative)   09/26/18  10:30    


 


Urine Glucose (UA)  Neg mg/dL (Negative)   09/26/18  10:30    


 


Urine Ketones  20 mg/dL (Negative)   09/26/18  10:30    


 


Urine Blood  Neg  (Negative)   09/26/18  10:30    


 


Urine Nitrite  Neg  (Negative)   09/26/18  10:30    


 


Urine Bilirubin  Neg  (Negative)   09/26/18  10:30    


 


Urine Urobilinogen  2.0 mg/dL (<2.0)   09/26/18  10:30    


 


Ur Leukocyte Esterase  Neg  (Negative)   09/26/18  10:30    


 


Urine WBC (Auto)  2.0 /HPF (0.0-6.0)   09/26/18  10:30    


 


Urine RBC (Auto)  1.0 /HPF (0.0-6.0)   09/26/18  10:30    


 


Urine Mucus  Few /HPF  09/26/18  10:30    


 


Blood Type  O NEGATIVE   09/26/18  07:57    


 


Antibody Screen  Negative   09/26/18  07:57

## 2018-09-27 NOTE — DISCHARGE SUMMARY
Providers





- Providers


Date of Admission: 


09/26/18 08:09





Date of discharge: 09/27/18


Attending physician: 


FERNANDA VÁSQUEZ





 





09/26/18 07:46


Consult to Physician [CONS] Urgent 


   Comment: 


   Consulting Provider: VIRAL PUENTE


   Physician Instructions: 


   Reason For Exam: coffee ground emesis





09/26/18 09:43


Consult to Physician [CONS] Routine 


   Comment: 


   Consulting Provider: FADI MO


   Physician Instructions: I notified, please add to their list


   Reason For Exam: Left renal stone











Primary care physician: 


PRIMARY CARE MD








Hospitalization


Condition: Stable


Hospital course: 


Patient is 39 yo man with a history of alcoholic pancreatitis, GERD and alcohol 

abuse who presents to [facility] ED with severe acute onset constant nonradiating 

epigastric abdominal pains.





* CT abd/pelvis with IV contrast CONCLUSION: 1. Extensive distal esophageal 

thickening as also nonspecific exaggerated gastric wall thickness, as detailed 

above. Etiologies as esophagitis/gastroesophageal reflux/hiatal hernia and 

peptic ulcer disease, amongst others, may be correlated for clinically in an 

appropriate setting. 2. Approximately 5 mm left renal calculus with mild 

dilatation of the left renal collecting system, as detailed above. No 

obstructing ureteral calculus though convincingly identified, as described. 3. 

Few other findings as diverticulosis, as above. I phoned the above results to 

Dr. Lopez in the ER, 8:40 AM, 9/26/2018.


* pCXR reported as unremarkable


* EKG SR 75, early repolarization





-SIRS noninfectious, due pancreatitis with wbc 13.4, and RR 22: treat empiric 

antibiotics for suspected Adelia Bray tear which was confirmed on EGD,


-Acute UGIB with normal H/H (early GIB) so far, due to  Severe esophagitis, 

Small adelia joaquin tear without high risk bleeding stigmata and Localized 

gastritis, likely from retching episodes.


-History of alcohol pancreatitis


-Accelerated hypertension: treat with iv hydralazine prn


-GERD: treat with ppi


-Alcohol abuse: counseling and education done, treat with iv thiamine, CIWA 

protocol


-Chest pains, atypical, most likely GERD related: check Troponin, treat with PPI


-Left renal Incidential calculus: consulted Urology


-DVT prophylaxis: scd only due to GIB bleed





EGD 9/26/18 Impression:


1. Severe esophagitis


2. Small adelia joaquin tear without high risk bleeding stigmata


3. Localized gastritis , likely from retching episodes.


Recommendations:


-cont PPI BID (po) dosing


-alcohol cessation


-avoid NSAID's


-repeat EGD in 2-3 months as outpatient to assess for esophagitis healing


Will sign off, please call as needed or with questions.  Follow-up in GI clinic 

in 1 month.





NM renal scan with lasix per Urology is pending, due at follow up, ok to d/c 

from Urology per Dr. Barnard





Disposition: DC-01 TO HOME OR SELFCARE


Time spent for discharge: 34 minutes





Core Measure Documentation





- Palliative Care


Palliative Care/ Comfort Measures: Not Applicable





- Core Measures


Any of the following diagnoses?: none





- VTE Discharge Requirements


Deep Vein Thrombosis/Pulmonary Embolism Present on Admission: No


Has pt received <5 days of overlap therapy or INR<2.0: No


Anticoagulant overlap therapy prescribed at discharge: No


Contraindication No Overlap Therapy order at DC: Not Indicated





Exam





- Physical Exam


Narrative exam: 


GEN: WDWN, NAD, Awake, Alert, Orientated x 3


HEENT: NCAT, EOMI, PERRL, OP Clear 


NECK: supple, no adenopathy, no thyromegaly, no JVD 


CVS/HEART: RRR, normal S1S2, pulses present bilaterally 


CHEST/LUNGS: CTA B, Symmetrical chest expansion, good air entry bilaterally 


GI/Abdomen: soft, nondistended, epigastric abd pains, good bowel sounds, no 

guarding or rebound 


/Bladder: no suprapubic tenderness, no CVA or paraspinal tenderness 


EXT/Skin: no c/c/e, no obvious rash 


MSK: FROM x 4 


Neuro: CN 2-12 grossly intact, no new focal deficits 


Psych: calm





- Constitutional


Vitals: 


 











Temp Pulse Resp BP Pulse Ox


 


 98.1 F   57 L  22   142/91   99 


 


 09/27/18 12:01  09/27/18 05:38  09/27/18 12:01  09/27/18 12:01  09/27/18 05:38














Plan


Activity: other (no strenous activity until cleared by pcp)


Diet: clear liquids (x 2-3 days then advance to soft x 2 days then advance as 

tolerates. no spicy, fried, greasy or fatty foods. )


Follow up with: 


VIRAL PUENTE MD [Staff Physician] - 7 Days


AMOS BARNARD MD [Staff Physician] - 7 Days


ADELE OLIVER [Staff Physician] - 7 Days


Prescriptions: 


Acetaminophen [Tylenol] 325 mg PO Q4H PRN #15 capsule


 PRN Reason: Pain, Moderate (4-6)


Amoxicillin/Potassium Clav [Augmentin 875-125 Tablet] 1 each PO BID #14 tablet


Pantoprazole [Protonix TAB] 40 mg PO BID #60 tablet


Thiamine [Vitamin B-1] 100 mg PO QDAY #30 tablet

## 2018-09-28 NOTE — NUCLEAR MEDICINE REPORT
NUCLEAR MEDICINE RENAL SCAN CAPTOPRIL/LASIX



HISTORY: Left hydronephrosis.



TECHNIQUE: Posterior flow and function images were obtained following 5 

mCi of technetium 99m MAG3. 20 mg of IV Lasix was administered at 20 

minutes. Renogram curves were constructed.



COMPARISON: CT abdomen pelvis with contrast dated 9/26/18. The CT does 

suggest mild pyelocaliectasis within the left kidney with transition 

point near the ureteropelvic junction. No discrete obstructing stone is 

visualized.



FINDINGS:

The posterior flow images demonstrate relatively symmetric perfusion to 

the kidneys.



The posterior function images demonstrate asymmetric activity with mild 

retention of the radiotracer in the left renal collecting system. Time 

to peak activity on the right side measures 2 minutes. Time to peak 

activity on the left side measures 12 minutes.



There is a brisk response following the administration of IV Lasix. 

There is prompt clearance of the radiotracer throughout both collecting 

systems.



Split function measures 47% left kidney and 53% right kidney.



IMPRESSION:

There is mild pyelocaliectasis in the left kidney. CT suggests a mild 

stenosis near the left ureteral pelvic junction. Nuclear medicine scan 

demonstrates prompt clearance of the radiotracer following IV Lasix 

consistent with no mechanical obstruction.

## 2019-11-30 NOTE — EMERGENCY DEPARTMENT REPORT
ED Abdominal Pain HPI





- General


Chief Complaint: Abdominal Pain


Stated Complaint: ABD PAIN


Time Seen by Provider: 11/30/19 16:36


Source: patient


Mode of arrival: Ambulatory


Limitations: No Limitations





- History of Present Illness


Initial Comments: 





39-year-old male with history of pancreatitis presents to ED with epigastric 

abdominal pain since this morning.  Patient reports he was out drinking last 

night, reports onset of abdominal pain this morning.  Patient states the pain 

feels the same as his previous episodes of pancreatitis.  Patient has associated

nausea and vomiting.  Denies fever.


MD Complaint: abdominal pain


-: This morning


Location: epigastric


Radiation: back


Migration to: no migration


Severity: moderate


Severity scale (0 -10): 10


Quality: sharp


Consistency: constant


Improves With: nothing


Worsens With: nothing


Context: other (recent alcohol use)


Associated Symptoms: nausea, vomiting.  denies: fever





- Related Data


                                  Previous Rx's











 Medication  Instructions  Recorded  Last Taken  Type


 


Acetaminophen [Tylenol] 325 mg PO Q4H PRN #15 capsule 09/27/18 Unknown Rx


 


Amoxicillin/Potassium Clav 1 each PO BID #14 tablet 09/27/18 Unknown Rx





[Augmentin 875-125 Tablet]    


 


Pantoprazole [Protonix TAB] 40 mg PO BID #60 tablet 09/27/18 Unknown Rx


 


Thiamine [Vitamin B-1] 100 mg PO QDAY #30 tablet 09/27/18 Unknown Rx


 


Dicyclomine [Bentyl] 20 mg PO QID PRN #20 tablet 11/30/19 Unknown Rx


 


Ondansetron [Zofran Odt] 4 mg PO Q8HR PRN #20 tab.rapdis 11/30/19 Unknown Rx


 


Promethazine [Phenergan TAB] 25 mg PO Q6HR PRN #20 tab 11/30/19 Unknown Rx


 


traMADoL [Ultram] 50 mg PO Q6HR PRN #7 tablet 11/30/19 Unknown Rx











                                    Allergies











Allergy/AdvReac Type Severity Reaction Status Date / Time


 


No Known Allergies Allergy   Verified 09/26/18 01:19














ED Review of Systems


ROS: 


Stated complaint: ABD PAIN


Other details as noted in HPI





Comment: All other systems reviewed and negative


Constitutional: denies: chills, fever


Cardiovascular: denies: chest pain


Gastrointestinal: abdominal pain, nausea, vomiting.  denies: diarrhea





ED Past Medical Hx





- Past Medical History


Previous Medical History?: Yes


Hx Kidney Stones: Yes


Additional medical history: pancreatis, kidney blockage





- Surgical History


Past Surgical History?: Yes


Hx Appendectomy: Yes


Additional Surgical History: tonsil





- Social History


Smoking Status: Current Every Day Smoker


Substance Use Type: Alcohol





- Medications


Home Medications: 


                                Home Medications











 Medication  Instructions  Recorded  Confirmed  Last Taken  Type


 


Acetaminophen [Tylenol] 325 mg PO Q4H PRN #15 capsule 09/27/18  Unknown Rx


 


Amoxicillin/Potassium Clav 1 each PO BID #14 tablet 09/27/18  Unknown Rx





[Augmentin 875-125 Tablet]     


 


Pantoprazole [Protonix TAB] 40 mg PO BID #60 tablet 09/27/18  Unknown Rx


 


Thiamine [Vitamin B-1] 100 mg PO QDAY #30 tablet 09/27/18  Unknown Rx


 


Dicyclomine [Bentyl] 20 mg PO QID PRN #20 tablet 11/30/19  Unknown Rx


 


Ondansetron [Zofran Odt] 4 mg PO Q8HR PRN #20 tab.rapdis 11/30/19  Unknown Rx


 


Promethazine [Phenergan TAB] 25 mg PO Q6HR PRN #20 tab 11/30/19  Unknown Rx


 


traMADoL [Ultram] 50 mg PO Q6HR PRN #7 tablet 11/30/19  Unknown Rx














ED Physical Exam





- General


Limitations: No Limitations


General appearance: alert, in no apparent distress





- Head


Head exam: Present: atraumatic, normocephalic





- Eye


Eye exam: Present: normal appearance





- ENT


ENT exam: Present: mucous membranes moist





- Neck


Neck exam: Present: normal inspection





- Respiratory


Respiratory exam: Present: normal lung sounds bilaterally.  Absent: respiratory 

distress





- Cardiovascular


Cardiovascular Exam: Present: regular rate, normal rhythm





- GI/Abdominal


GI/Abdominal exam: Present: soft, tenderness (moderate epigastric tenderness).  

Absent: distended, guarding, rebound





- Extremities Exam


Extremities exam: Present: normal inspection





- Neurological Exam


Neurological exam: Present: alert, oriented X3





- Psychiatric


Psychiatric exam: Present: normal affect, normal mood





- Skin


Skin exam: Present: warm, dry, intact, normal color





ED Course


                                   Vital Signs











  11/30/19 11/30/19





  13:55 16:39


 


Temperature 97.4 F L 


 


Pulse Rate 76 76


 


Respiratory 18 17





Rate  


 


Blood Pressure 165/109 


 


Blood Pressure  162/115





[Left]  


 


O2 Sat by Pulse 100 98





Oximetry  














ED Medical Decision Making





- Lab Data


Result diagrams: 


                                 11/30/19 15:07





                                 11/30/19 15:07





- Radiology Data


Radiology results: report reviewed, image reviewed





- Medical Decision Making





40 yo M w/ acute on chronic pancreatitis.  Labs show slight elevation in WBCs.  

Lipase level of 8.  CT Abd/Pelvis is unremarkable.  BP initially elevated, 

however, pt has received pain meds and nausea meds and is currently feeling much

 better.  BP improved.  Will discharge at this time.  Pt advised to stop dri

nking alcohol.  Outpatient follow up advised.  Return precautions given.





- Differential Diagnosis


pancreatitis, bowel obstruction, gastritis


Critical care attestation.: 


If time is entered above; I have spent that time in minutes in the direct care 

of this critically ill patient, excluding procedure time.








ED Disposition


Clinical Impression: 


 Acute on chronic pancreatitis





Disposition: DC-01 TO HOME OR SELFCARE


Is pt being admited?: No


Condition: Stable


Instructions:  Pancreatitis (ED)


Prescriptions: 


Dicyclomine [Bentyl] 20 mg PO QID PRN #20 tablet


 PRN Reason: abdominal pain


Promethazine [Phenergan TAB] 25 mg PO Q6HR PRN #20 tab


 PRN Reason: Nausea


traMADoL [Ultram] 50 mg PO Q6HR PRN #7 tablet


 PRN Reason: Pain


Ondansetron [Zofran Odt] 4 mg PO Q8HR PRN #20 tab.rapdis


 PRN Reason: Vomiting


Referrals: 


[facility] [Provider Group] - 3-5 Days


PRIMARY CARE,MD [Primary Care Provider] - 3-5 Days


Time of Disposition: 20:46

## 2019-11-30 NOTE — EVENT NOTE
ED Screening Note


Date of service: 11/30/19


Time: 14:37


ED Screening Note: 


39 y o male presents with n/v with abd pain x this am


had food and drinks last night at a club


hx of pancreatis





This initial assessment/diagnostic orders/clinical plan/treatment(s) is/are 

subject to change based on patients health status, clinical progression and re-

assessment by fellow clinical providers in the ED. Further treatment and workup 

at subsequent clinical providers discretion. Patient/guardian urged not to elope

from the ED as their condition may be serious if not clinically assessed and 

managed. 





Initial orders include: 


labs

## 2019-11-30 NOTE — CAT SCAN REPORT
CT abdomen pelvis wo con



INDICATION:

Abdominal Pain.



TECHNIQUE:

All CT scans at this location are performed using the following dose modulation technique: Automated 
exposure control. Helical slices were obtained through the abdomen and pelvis. No contrast is adminis
tered. 



COMPARISON:

CT scan dated 9/26/2018



FINDINGS:

Abdomen: Lung bases are clear. Liver, spleen, pancreas, adrenal glands, and right kidney are unremark
able. There is calyceal stone in the mid left kidney. There is no hydronephrosis. There are no ureter
al calculi The aorta is normal in diameter. There is no obstruction, inflammation, or free air. There
 are scattered diverticula throughout the colon.



Pelvis: There are phleboliths. There is no inflammatory change. There are no abnormal fluid collectio
ns. The appendix is not seen.





On review of bone windows, no acute osseous abnormalities are seen.



IMPRESSION:

 There is nephrolithiasis on the left. There is no hydronephrosis. There are no ureteral calculi.



There is no obstruction, inflammation, or free air. There are no abnormal fluid collections.



There are scattered diverticula in the colon. There is no CT evidence of diverticulitis



Signer Name: Julián Gilman MD 

Signed: 11/30/2019 3:30 PM

 Workstation Name: [id number]

## 2021-12-21 ENCOUNTER — HOSPITAL ENCOUNTER (EMERGENCY)
Dept: HOSPITAL 5 - ED | Age: 41
Discharge: HOME | End: 2021-12-21
Payer: SELF-PAY

## 2021-12-21 VITALS — DIASTOLIC BLOOD PRESSURE: 77 MMHG | SYSTOLIC BLOOD PRESSURE: 140 MMHG

## 2021-12-21 DIAGNOSIS — R11.2: ICD-10-CM

## 2021-12-21 DIAGNOSIS — F17.200: ICD-10-CM

## 2021-12-21 DIAGNOSIS — R10.13: Primary | ICD-10-CM

## 2021-12-21 DIAGNOSIS — R03.0: ICD-10-CM

## 2021-12-21 LAB
ALBUMIN SERPL-MCNC: 4.5 G/DL (ref 3.9–5)
ALT SERPL-CCNC: 21 UNITS/L (ref 7–56)
BASOPHILS # (AUTO): 0 K/MM3 (ref 0–0.1)
BASOPHILS NFR BLD AUTO: 0.3 % (ref 0–1.8)
BILIRUB UR QL STRIP: (no result)
BLOOD UR QL VISUAL: (no result)
BUN SERPL-MCNC: 14 MG/DL (ref 9–20)
BUN/CREAT SERPL: 18 %
CALCIUM SERPL-MCNC: 9.4 MG/DL (ref 8.4–10.2)
EOSINOPHIL # BLD AUTO: 0 K/MM3 (ref 0–0.4)
EOSINOPHIL NFR BLD AUTO: 0 % (ref 0–4.3)
HCT VFR BLD CALC: 43.9 % (ref 35.5–45.6)
HEMOLYSIS INDEX: 10
HGB BLD-MCNC: 14.3 GM/DL (ref 11.8–15.2)
LYMPHOCYTES # BLD AUTO: 1.3 K/MM3 (ref 1.2–5.4)
LYMPHOCYTES NFR BLD AUTO: 10.1 % (ref 13.4–35)
MCHC RBC AUTO-ENTMCNC: 33 % (ref 32–34)
MCV RBC AUTO: 92 FL (ref 84–94)
MONOCYTES # (AUTO): 0.5 K/MM3 (ref 0–0.8)
MONOCYTES % (AUTO): 4.2 % (ref 0–7.3)
MUCOUS THREADS #/AREA URNS HPF: (no result) /HPF
PH UR STRIP: 8 [PH] (ref 5–7)
PLATELET # BLD: 252 K/MM3 (ref 140–440)
PROT UR STRIP-MCNC: (no result) MG/DL
RBC # BLD AUTO: 4.79 M/MM3 (ref 3.65–5.03)
RBC #/AREA URNS HPF: < 1 /HPF (ref 0–6)
UROBILINOGEN UR-MCNC: < 2 MG/DL (ref ?–2)
WBC #/AREA URNS HPF: 1 /HPF (ref 0–6)

## 2021-12-21 PROCEDURE — 85025 COMPLETE CBC W/AUTO DIFF WBC: CPT

## 2021-12-21 PROCEDURE — C9113 INJ PANTOPRAZOLE SODIUM, VIA: HCPCS

## 2021-12-21 PROCEDURE — 80053 COMPREHEN METABOLIC PANEL: CPT

## 2021-12-21 PROCEDURE — 96374 THER/PROPH/DIAG INJ IV PUSH: CPT

## 2021-12-21 PROCEDURE — 36415 COLL VENOUS BLD VENIPUNCTURE: CPT

## 2021-12-21 PROCEDURE — 99284 EMERGENCY DEPT VISIT MOD MDM: CPT

## 2021-12-21 PROCEDURE — 81001 URINALYSIS AUTO W/SCOPE: CPT

## 2021-12-21 PROCEDURE — 96361 HYDRATE IV INFUSION ADD-ON: CPT

## 2021-12-21 PROCEDURE — 83690 ASSAY OF LIPASE: CPT

## 2021-12-21 PROCEDURE — 74177 CT ABD & PELVIS W/CONTRAST: CPT

## 2021-12-21 PROCEDURE — 96375 TX/PRO/DX INJ NEW DRUG ADDON: CPT

## 2021-12-21 RX ADMIN — SODIUM CHLORIDE ONE MLS/HR: 0.9 INJECTION, SOLUTION INTRAVENOUS at 15:27

## 2021-12-21 RX ADMIN — SODIUM CHLORIDE ONE MLS/HR: 0.9 INJECTION, SOLUTION INTRAVENOUS at 18:03

## 2021-12-21 NOTE — EMERGENCY DEPARTMENT REPORT
ED Abdominal Pain HPI





- General


Chief Complaint: Abdominal Pain


Stated Complaint: Abdominal Pain


Time Seen by Provider: 12/21/21 14:49


Source: patient


Mode of arrival: Wheelchair


Limitations: No Limitations





- History of Present Illness


Initial Comments: 





Patient is a 41-year-old male presents emergency room with complaints of 

epigastric and left flank pain that began this morning when he woke up.  He has 

associated nausea and vomiting and unable to tolerate p.o. intake.  Patient 

states he has a past medical history of pancreatitis and nephrolithiasis.  He 

states that yesterday he went to a [place] party and did drink alcohol.  He 

denies any diarrhea, fever, hematochezia, melena, hematemesis, urinary symptoms.

 Patient had EGD performed at this hospital with severe esophagitis and 

gastritis.  He never followed back up with GI.  No allergies to medications.


Severity scale (0 -10): 10





- Related Data


                                  Previous Rx's











 Medication  Instructions  Recorded  Last Taken  Type


 


Acetaminophen [Tylenol] 325 mg PO Q4H PRN #15 capsule 09/27/18 Unknown Rx


 


Amoxicillin/Potassium Clav 1 each PO BID #14 tablet 09/27/18 Unknown Rx





[Augmentin 875-125 Tablet]    


 


Thiamine [Vitamin B-1] 100 mg PO QDAY #30 tablet 09/27/18 Unknown Rx


 


Promethazine [Phenergan TAB] 25 mg PO Q6HR PRN #20 tab 11/30/19 Unknown Rx


 


Dicyclomine [Bentyl] 20 mg PO QID PRN #20 tablet 12/21/21 Unknown Rx


 


Ondansetron [Zofran ODT TAB] 4 mg PO Q8HR PRN #20 tab.rapdis 12/21/21 Unknown Rx


 


Pantoprazole [Protonix TAB] 40 mg PO BID #60 tablet 12/21/21 Unknown Rx


 


traMADoL [Ultram 50 MG tab] 50 mg PO Q6HR PRN #10 tablet 12/21/21 Unknown Rx











                                    Allergies











Allergy/AdvReac Type Severity Reaction Status Date / Time


 


No Known Allergies Allergy   Verified 12/21/21 14:56














ED Review of Systems


ROS: 


Stated complaint: Abdominal Pain


Other details as noted in HPI





Comment: All other systems reviewed and negative





ED Past Medical Hx





- Past Medical History


Hx Kidney Stones: Yes


Additional medical history: pancreatis, kidney blockage





- Surgical History


Hx Appendectomy: Yes


Additional Surgical History: tonsil





- Social History


Smoking Status: Current Every Day Smoker


Substance Use Type: Alcohol





- Medications


Home Medications: 


                                Home Medications











 Medication  Instructions  Recorded  Confirmed  Last Taken  Type


 


Acetaminophen [Tylenol] 325 mg PO Q4H PRN #15 capsule 09/27/18  Unknown Rx


 


Amoxicillin/Potassium Clav 1 each PO BID #14 tablet 09/27/18  Unknown Rx





[Augmentin 875-125 Tablet]     


 


Thiamine [Vitamin B-1] 100 mg PO QDAY #30 tablet 09/27/18  Unknown Rx


 


Promethazine [Phenergan TAB] 25 mg PO Q6HR PRN #20 tab 11/30/19  Unknown Rx


 


Dicyclomine [Bentyl] 20 mg PO QID PRN #20 tablet 12/21/21  Unknown Rx


 


Ondansetron [Zofran ODT TAB] 4 mg PO Q8HR PRN #20 tab.rapdis 12/21/21  Unknown 

Rx


 


Pantoprazole [Protonix TAB] 40 mg PO BID #60 tablet 12/21/21  Unknown Rx


 


traMADoL [Ultram 50 MG tab] 50 mg PO Q6HR PRN #10 tablet 12/21/21  Unknown Rx














ED Physical Exam





- General


Limitations: No Limitations


General appearance: alert, in no apparent distress





- Head


Head exam: Present: atraumatic, normocephalic





- Eye


Eye exam: Present: normal appearance





- ENT


ENT exam: Present: mucous membranes moist





- Respiratory


Respiratory exam: Present: normal lung sounds bilaterally.  Absent: respiratory 

distress, wheezes, rales, rhonchi, stridor, chest wall tenderness, accessory 

muscle use, decreased breath sounds, prolonged expiratory





- Cardiovascular


Cardiovascular Exam: Present: regular rate, normal rhythm, normal heart sounds. 

Absent: systolic murmur, diastolic murmur, rubs, gallop





- GI/Abdominal


GI/Abdominal exam: Present: soft, tenderness (epigastric), normal bowel sounds. 

Absent: distended, guarding, rebound, rigid





- Back Exam


Back exam: Present: CVA tenderness (L).  Absent: CVA tenderness (R)





- Neurological Exam


Neurological exam: Present: alert, oriented X3





- Psychiatric


Psychiatric exam: Present: normal affect, normal mood





- Skin


Skin exam: Present: warm, dry, intact





ED Course


                                   Vital Signs











  12/21/21 12/21/21 12/21/21





  14:50 18:05 18:13


 


Temperature 97.4 F L 98.1 F 


 


Pulse Rate 66 81 


 


Respiratory 18 18 





Rate   


 


Blood Pressure 166/95 176/109 





[Left]   


 


O2 Sat by Pulse 100 95 96





Oximetry   














  12/21/21





  19:21


 


Temperature 


 


Pulse Rate 98 H


 


Respiratory 14





Rate 


 


Blood Pressure 140/77





[Left] 


 


O2 Sat by Pulse 96





Oximetry 














ED Medical Decision Making





- Lab Data


Result diagrams: 


                                 12/21/21 15:18





                                 12/21/21 15:18








                                   Lab Results











  12/21/21 12/21/21 12/21/21 Range/Units





  15:18 15:18 18:15 


 


WBC  12.4 H    (4.5-11.0)  K/mm3


 


RBC  4.79    (3.65-5.03)  M/mm3


 


Hgb  14.3    (11.8-15.2)  gm/dl


 


Hct  43.9    (35.5-45.6)  %


 


MCV  92    (84-94)  fl


 


MCH  30    (28-32)  pg


 


MCHC  33    (32-34)  %


 


RDW  13.1 L    (13.2-15.2)  %


 


Plt Count  252    (140-440)  K/mm3


 


Lymph % (Auto)  10.1 L    (13.4-35.0)  %


 


Mono % (Auto)  4.2    (0.0-7.3)  %


 


Eos % (Auto)  0.0    (0.0-4.3)  %


 


Baso % (Auto)  0.3    (0.0-1.8)  %


 


Lymph # (Auto)  1.3    (1.2-5.4)  K/mm3


 


Mono # (Auto)  0.5    (0.0-0.8)  K/mm3


 


Eos # (Auto)  0.0    (0.0-0.4)  K/mm3


 


Baso # (Auto)  0.0    (0.0-0.1)  K/mm3


 


Seg Neutrophils %  85.4 H    (40.0-70.0)  %


 


Seg Neutrophils #  10.6 H    (1.8-7.7)  K/mm3


 


Sodium   143   (137-145)  mmol/L


 


Potassium   3.5 L   (3.6-5.0)  mmol/L


 


Chloride   103.9   ()  mmol/L


 


Carbon Dioxide   21 L   (22-30)  mmol/L


 


Anion Gap   22   mmol/L


 


BUN   14   (9-20)  mg/dL


 


Creatinine   0.8   (0.8-1.3)  mg/dL


 


Estimated GFR   > 60   ml/min


 


BUN/Creatinine Ratio   18   %


 


Glucose   139 H   ()  mg/dL


 


Calcium   9.4   (8.4-10.2)  mg/dL


 


Total Bilirubin   0.40   (0.1-1.2)  mg/dL


 


AST   14   (5-40)  units/L


 


ALT   21   (7-56)  units/L


 


Alkaline Phosphatase   83   ()  units/L


 


Total Protein   7.6   (6.3-8.2)  g/dL


 


Albumin   4.5   (3.9-5)  g/dL


 


Albumin/Globulin Ratio   1.5   %


 


Lipase   10 L   (13-60)  units/L


 


Urine Color    Yellow  (Yellow)  


 


Urine Turbidity    Clear  (Clear)  


 


Urine pH    8.0 H  (5.0-7.0)  


 


Ur Specific Gravity    > 1.059 H  (1.003-1.030)  


 


Urine Protein    <15 mg/dl  (Negative)  mg/dL


 


Urine Glucose (UA)    Neg  (Negative)  mg/dL


 


Urine Ketones    Neg  (Negative)  mg/dL


 


Urine Blood    Neg  (Negative)  


 


Urine Nitrite    Neg  (Negative)  


 


Urine Bilirubin    Neg  (Negative)  


 


Urine Urobilinogen    < 2.0  (<2.0)  mg/dL


 


Ur Leukocyte Esterase    Neg  (Negative)  


 


Urine WBC (Auto)    1.0  (0.0-6.0)  /HPF


 


Urine RBC (Auto)    < 1.0  (0.0-6.0)  /HPF


 


Urine Mucus    Few  /HPF











                                   Vital Signs











  12/21/21 12/21/21 12/21/21





  14:50 18:05 18:13


 


Temperature 97.4 F L 98.1 F 


 


Pulse Rate 66 81 


 


Respiratory 18 18 





Rate   


 


Blood Pressure 166/95 176/109 





[Left]   


 


O2 Sat by Pulse 100 95 96





Oximetry   














  12/21/21





  19:21


 


Temperature 


 


Pulse Rate 98 H


 


Respiratory 14





Rate 


 


Blood Pressure 140/77





[Left] 


 


O2 Sat by Pulse 96





Oximetry 














- Radiology Data


Radiology results: report reviewed





Ordering Physician: GUILLERMINA HEBERT  


Date of Service: 12/21/21  


Procedure(s): CT abdomen pelvis w con  


Accession Number(s): [id number]  


 


cc: GUILLERMINA HEBERT   


 


 


CT ABDOMEN AND PELVIS WITH CONTRAST  


 


 INDICATION / CLINICAL INFORMATION: epigastric abd pain, left flank pain, n/v  

100 ML OMNI 300 .  


 


 TECHNIQUE: Axial CT images were obtained through the abdomen and pelvis after 

100 cc of Omnipaque 


300 IV contrast.  All CT scans at this location are performed using CT dose 

reduction for ALARA by 


means of automated exposure control.   


 


 COMPARISON: 11/30/2019, 9/26/2018  


 


 FINDINGS:  


 


 LOWER CHEST: No significant abnormality.  


 


 AORTA / ARTERIES: No significant abnormality.   


 IVC / VEINS: No significant abnormality.  


 


 LYMPH NODES: No significant adenopathy.  


 


 COLON: Diverticulosis without acute inflammation. Mild fatty infiltration of 

the colonic wall 


throughout its entire course.   


 APPENDIX: Not visualized.    


 STOMACH / SMALL BOWEL: No significant abnormality.   


 PERITONEUM: No free fluid. No free air. No fluid collection.  


 


 


 LIVER: No significant abnormality.  


 GALLBLADDER: No significant abnormality.    


 BILE DUCTS: No significant abnormality.  


 


 PANCREAS: No significant abnormality.  


 


 SPLEEN: No significant abnormality.  


 


 ADRENALS: No significant abnormality.  


 


 RIGHT KIDNEY / URETER: No significant abnormality.  


 LEFT KIDNEY / URETER: There is a 7 mm stone within the left kidney. There is 

dilation of the 


collecting system, and osteophyte change compared to 9/26/2018.  


 


 URINARY BLADDER: No significant abnormality.  


 


 REPRODUCTIVE ORGANS: No significant abnormality.  


 


 SKELETAL SYSTEM: No significant abnormality.  


 


 ADDITIONAL FINDINGS: None.  


 


 IMPRESSION:  


 1. There is diverticulosis without diverticulitis.  


 2. There is diffuse fatty infiltration of the colonic wall. This can be seen 

with inflammatory 


bowel disease such as celiac' s, correlate clinically.  


 3. Chronic dilation of the left collecting system with a redemonstrated and not

 simply change 7 mm 


stone.  


 


 Signer Name: Hernan Arnold DO   


 Signed: 12/21/2021 5:16 PM  


 Workstation Name: [id number]   


 


 


Transcribed By: NS  


Dictated By: HERNAN ARNOLD DO  


Electronically Authenticated By: HERNAN ARNOLD DO    


Signed Date/Time: 12/21/21 1716                                


 


 


 


DD/DT: 12/21/21 1706                                                            

  


TD/TT:








- Medical Decision Making





Patient is a 41-year-old male presents emergency room with complaints of 

epigastric and left flank pain that began this morning when he woke up.  He has 

associated nausea and vomiting and unable to tolerate p.o. intake.  Patient 

states he has a past medical history of pancreatitis and nephrolithiasis.  He 

states that yesterday he went to a [place] party and did drink alcohol.  He 

denies any diarrhea, fever, hematochezia, melena, hematemesis, urinary symptoms.

  Patient had EGD performed at this hospital with severe esophagitis and 

gastritis.  He never followed back up with GI.  No allergies to medications. 

initial vitals with elevated blood pressure which improved upon repeat.  Labs 

with mild leukocytosis.  UA shows evidence of dehydration. ct abd pelvis with IV

 contrast  1. There is diverticulosis without diverticulitis.   2. There is 

diffuse fatty infiltration of the colonic wall. This can be seen with 

inflammatory bowel disease such as celiac' s, correlate clinically.  3. Chronic 

dilation of the left collecting system with a redemonstrated and not simply 

change 7 mm stone.  Patient given medication while in the emergency department 

and on reexamination he is sleeping and resting comfortably.  Patient states 

that he is feeling much better.  Discussed all findings with patient and the 

importance of GI follow-up.  Advised patient Please take medication as 

prescribed.  Follow-up with a primary care doctor.  Follow-up with a GI doctor. 

 Please follow-up with your primary care doctor regarding the elevation your 

blood pressure during today's visit.  Eat a low-sodium diet.  Incorporate 30 to 

60 minutes of daily exercise.  Please stop drinking and smoking marijuana.  

Return to emergency room for any new or worsening symptoms.


Critical care attestation.: 


If time is entered above; I have spent that time in minutes in the direct care 

of this critically ill patient, excluding procedure time.








ED Disposition


Clinical Impression: 


 Elevated blood pressure reading





Abdominal pain


Qualifiers:


 Abdominal location: epigastric Qualified Code(s): R10.13 - Epigastric pain





Nausea & vomiting


Qualifiers:


 Vomiting type: unspecified Qualified Code(s): R11.2 - Nausea with vomiting, 

unspecified





Disposition: 01 HOME / SELF CARE / HOMELESS


Is pt being admited?: No


Does the pt Need Aspirin: No


Condition: Stable


Instructions:  Abdominal Pain, Adult, Nausea and Vomiting, Adult, Easy-to-Read


Additional Instructions: 


Please take medication as prescribed.  Follow-up with a primary care doctor.  

Follow-up with a GI doctor.  Please follow-up with your primary care doctor 

regarding the elevation your blood pressure during today's visit.  Eat a low-

sodium diet.  Incorporate 30 to 60 minutes of daily exercise.  Please stop 

drinking and smoking marijuana.  Return to emergency room for any new or 

worsening symptoms.


Prescriptions: 


Dicyclomine [Bentyl] 20 mg PO QID PRN #20 tablet


 PRN Reason: abdominal pain


Pantoprazole [Protonix TAB] 40 mg PO BID #60 tablet


traMADoL [Ultram 50 MG tab] 50 mg PO Q6HR PRN #10 tablet


 PRN Reason: Pain , Severe (7-10)


Ondansetron [Zofran ODT TAB] 4 mg PO Q8HR PRN #20 tab.rapdis


 PRN Reason: Vomiting


Referrals: 


PRIMARY CARE,MD [Primary Care Provider] - 3-5 Days


[facility] [Provider Group] - 3-5 Days


Time of Disposition: 18:57


Print Language: ENGLISH

## 2021-12-21 NOTE — CAT SCAN REPORT
CT ABDOMEN AND PELVIS WITH CONTRAST



INDICATION / CLINICAL INFORMATION: epigastric abd pain, left flank pain, n/v  100 ML OMNI 300 .



TECHNIQUE: Axial CT images were obtained through the abdomen and pelvis after 100 cc of Omnipaque 300
 IV contrast.  All CT scans at this location are performed using CT dose reduction for ALARA by means
 of automated exposure control. 



COMPARISON: 11/30/2019, 9/26/2018



FINDINGS:



LOWER CHEST: No significant abnormality.



AORTA / ARTERIES: No significant abnormality. 

IVC / VEINS: No significant abnormality.



LYMPH NODES: No significant adenopathy.



COLON: Diverticulosis without acute inflammation. Mild fatty infiltration of the colonic wall through
out its entire course. 

APPENDIX: Not visualized.  

STOMACH / SMALL BOWEL: No significant abnormality. 

PERITONEUM: No free fluid. No free air. No fluid collection.





LIVER: No significant abnormality.

GALLBLADDER: No significant abnormality.  

BILE DUCTS: No significant abnormality.



PANCREAS: No significant abnormality.



SPLEEN: No significant abnormality.



ADRENALS: No significant abnormality.



RIGHT KIDNEY / URETER: No significant abnormality.

LEFT KIDNEY / URETER: There is a 7 mm stone within the left kidney. There is dilation of the collecti
ng system, and osteophyte change compared to 9/26/2018.



URINARY BLADDER: No significant abnormality.



REPRODUCTIVE ORGANS: No significant abnormality.



SKELETAL SYSTEM: No significant abnormality.



ADDITIONAL FINDINGS: None.



IMPRESSION:

1. There is diverticulosis without diverticulitis.

2. There is diffuse fatty infiltration of the colonic wall. This can be seen with inflammatory bowel 
disease such as celiac' s, correlate clinically.

3. Chronic dilation of the left collecting system with a redemonstrated and not simply change 7 mm st
one.



Signer Name: Hernan Arnold DO 

Signed: 12/21/2021 5:16 PM

Workstation Name: [id number]